# Patient Record
Sex: FEMALE | Race: ASIAN
[De-identification: names, ages, dates, MRNs, and addresses within clinical notes are randomized per-mention and may not be internally consistent; named-entity substitution may affect disease eponyms.]

---

## 2019-08-02 ENCOUNTER — APPOINTMENT (OUTPATIENT)
Dept: PULMONOLOGY | Facility: CLINIC | Age: 69
End: 2019-08-02
Payer: SELF-PAY

## 2019-08-02 VITALS
HEART RATE: 73 BPM | WEIGHT: 140 LBS | HEIGHT: 66 IN | DIASTOLIC BLOOD PRESSURE: 84 MMHG | RESPIRATION RATE: 16 BRPM | OXYGEN SATURATION: 96 % | BODY MASS INDEX: 22.5 KG/M2 | TEMPERATURE: 98 F | SYSTOLIC BLOOD PRESSURE: 144 MMHG

## 2019-08-02 DIAGNOSIS — R05 COUGH: ICD-10-CM

## 2019-08-02 DIAGNOSIS — I10 ESSENTIAL (PRIMARY) HYPERTENSION: ICD-10-CM

## 2019-08-02 PROBLEM — Z00.00 ENCOUNTER FOR PREVENTIVE HEALTH EXAMINATION: Status: ACTIVE | Noted: 2019-08-02

## 2019-08-02 PROCEDURE — 99242 OFF/OP CONSLTJ NEW/EST SF 20: CPT | Mod: 25

## 2019-08-02 PROCEDURE — 94060 EVALUATION OF WHEEZING: CPT

## 2019-08-02 RX ORDER — METHYLPREDNISOLONE 4 MG/1
4 TABLET ORAL
Qty: 1 | Refills: 0 | Status: ACTIVE | COMMUNITY
Start: 2019-08-02 | End: 1900-01-01

## 2019-08-04 PROBLEM — I10 HYPERTENSION: Status: ACTIVE | Noted: 2019-08-04

## 2019-08-04 NOTE — HISTORY OF PRESENT ILLNESS
[FreeTextEntry1] : Mrs. Flores is a pleasant 69-year-old female with history of hypertension, presents with yellowish productive cough for the last 8 days, she also has throat clearing, no heartburn, chest pain, fever or chills

## 2019-08-04 NOTE — ASSESSMENT
[FreeTextEntry1] : Advised her to finish the seven-day course of Augmentin\par Start Medrol Dosepak\par Start Flonase nasal spray

## 2019-08-04 NOTE — PROCEDURE
[FreeTextEntry1] : Spirometry showed suggestive evidence of mild restrictive defect with no improvement postbronchodilator

## 2019-08-04 NOTE — DISCUSSION/SUMMARY
[FreeTextEntry1] : Ms. Flores is a patient with chronic cough secondary to sinusitis and post nasal drip